# Patient Record
Sex: FEMALE | Race: WHITE | NOT HISPANIC OR LATINO | URBAN - METROPOLITAN AREA
[De-identification: names, ages, dates, MRNs, and addresses within clinical notes are randomized per-mention and may not be internally consistent; named-entity substitution may affect disease eponyms.]

---

## 2017-06-05 ENCOUNTER — APPOINTMENT (RX ONLY)
Dept: URBAN - METROPOLITAN AREA CLINIC 156 | Facility: CLINIC | Age: 62
Setting detail: DERMATOLOGY
End: 2017-06-05

## 2017-06-05 VITALS — WEIGHT: 196 LBS | DIASTOLIC BLOOD PRESSURE: 86 MMHG | HEART RATE: 91 BPM | SYSTOLIC BLOOD PRESSURE: 129 MMHG

## 2017-06-05 DIAGNOSIS — L259 CONTACT DERMATITIS AND OTHER ECZEMA, UNSPECIFIED CAUSE: ICD-10-CM

## 2017-06-05 PROBLEM — I10 ESSENTIAL (PRIMARY) HYPERTENSION: Status: ACTIVE | Noted: 2017-06-05

## 2017-06-05 PROBLEM — L23.9 ALLERGIC CONTACT DERMATITIS, UNSPECIFIED CAUSE: Status: ACTIVE | Noted: 2017-06-05

## 2017-06-05 PROCEDURE — ? PRESCRIPTION

## 2017-06-05 PROCEDURE — ? TREATMENT REGIMEN

## 2017-06-05 PROCEDURE — ? COUNSELING

## 2017-06-05 PROCEDURE — 99202 OFFICE O/P NEW SF 15 MIN: CPT

## 2017-06-05 RX ORDER — DESONIDE 0.5 MG/G
CREAM TOPICAL
Qty: 3 | Refills: 1 | Status: ERX

## 2017-06-05 RX ORDER — PREDNISONE 20 MG/1
TABLET ORAL
Qty: 30 | Refills: 0 | Status: ERX

## 2017-06-05 ASSESSMENT — LOCATION ZONE DERM: LOCATION ZONE: FACE

## 2017-06-05 ASSESSMENT — LOCATION SIMPLE DESCRIPTION DERM
LOCATION SIMPLE: LEFT CHEEK
LOCATION SIMPLE: INFERIOR FOREHEAD
LOCATION SIMPLE: RIGHT CHEEK

## 2017-06-05 ASSESSMENT — LOCATION DETAILED DESCRIPTION DERM
LOCATION DETAILED: INFERIOR MID FOREHEAD
LOCATION DETAILED: RIGHT SUPERIOR LATERAL MALAR CHEEK
LOCATION DETAILED: LEFT LATERAL BUCCAL CHEEK

## 2017-08-23 ENCOUNTER — APPOINTMENT (RX ONLY)
Dept: URBAN - METROPOLITAN AREA CLINIC 156 | Facility: CLINIC | Age: 62
Setting detail: DERMATOLOGY
End: 2017-08-23

## 2017-08-23 DIAGNOSIS — L82.1 OTHER SEBORRHEIC KERATOSIS: ICD-10-CM

## 2017-08-23 DIAGNOSIS — L259 CONTACT DERMATITIS AND OTHER ECZEMA, UNSPECIFIED CAUSE: ICD-10-CM | Status: INADEQUATELY CONTROLLED

## 2017-08-23 PROBLEM — L23.9 ALLERGIC CONTACT DERMATITIS, UNSPECIFIED CAUSE: Status: ACTIVE | Noted: 2017-08-23

## 2017-08-23 PROCEDURE — ? PRESCRIPTION

## 2017-08-23 PROCEDURE — ? COUNSELING

## 2017-08-23 PROCEDURE — 99213 OFFICE O/P EST LOW 20 MIN: CPT

## 2017-08-23 PROCEDURE — ? TREATMENT REGIMEN

## 2017-08-23 RX ORDER — KETOCONAZOLE 20 MG/G
CREAM TOPICAL
Qty: 1 | Refills: 0 | Status: ERX

## 2017-08-23 ASSESSMENT — LOCATION DETAILED DESCRIPTION DERM
LOCATION DETAILED: RIGHT SUPERIOR LATERAL MALAR CHEEK
LOCATION DETAILED: INFERIOR MID FOREHEAD
LOCATION DETAILED: LEFT LATERAL BUCCAL CHEEK
LOCATION DETAILED: LEFT SUPERIOR PREAURICULAR CHEEK

## 2017-08-23 ASSESSMENT — LOCATION SIMPLE DESCRIPTION DERM
LOCATION SIMPLE: INFERIOR FOREHEAD
LOCATION SIMPLE: RIGHT CHEEK
LOCATION SIMPLE: LEFT CHEEK

## 2017-08-23 ASSESSMENT — LOCATION ZONE DERM: LOCATION ZONE: FACE

## 2017-08-23 NOTE — PROCEDURE: TREATMENT REGIMEN
Discontinue Regimen: Stop all cosmetic products
Detail Level: Simple
Plan: Start Vanicream products daily. Apply Desonide and Ketoconazole cream daily x 7 days, after 7 days STOP the Desonide and continue the Ketoconazole
Otc Regimen: Vanicream bar soap, Vanicream Lite lotions, Neutrogena Pure & Free baby sunscreen

## 2017-09-27 ENCOUNTER — APPOINTMENT (RX ONLY)
Dept: URBAN - METROPOLITAN AREA CLINIC 156 | Facility: CLINIC | Age: 62
Setting detail: DERMATOLOGY
End: 2017-09-27

## 2017-09-27 VITALS
WEIGHT: 196 LBS | SYSTOLIC BLOOD PRESSURE: 141 MMHG | HEART RATE: 79 BPM | DIASTOLIC BLOOD PRESSURE: 84 MMHG | HEIGHT: 64 IN

## 2017-09-27 DIAGNOSIS — L259 CONTACT DERMATITIS AND OTHER ECZEMA, UNSPECIFIED CAUSE: ICD-10-CM

## 2017-09-27 PROBLEM — L30.9 DERMATITIS, UNSPECIFIED: Status: ACTIVE | Noted: 2017-09-27

## 2017-09-27 PROCEDURE — ? TREATMENT REGIMEN

## 2017-09-27 PROCEDURE — ? COUNSELING

## 2017-09-27 PROCEDURE — 99212 OFFICE O/P EST SF 10 MIN: CPT

## 2017-09-27 ASSESSMENT — LOCATION DETAILED DESCRIPTION DERM
LOCATION DETAILED: RIGHT INFERIOR CENTRAL MALAR CHEEK
LOCATION DETAILED: INFERIOR MID FOREHEAD
LOCATION DETAILED: LEFT INFERIOR CENTRAL MALAR CHEEK

## 2017-09-27 ASSESSMENT — LOCATION SIMPLE DESCRIPTION DERM
LOCATION SIMPLE: INFERIOR FOREHEAD
LOCATION SIMPLE: RIGHT CHEEK
LOCATION SIMPLE: LEFT CHEEK

## 2017-09-27 ASSESSMENT — LOCATION ZONE DERM: LOCATION ZONE: FACE

## 2017-09-27 NOTE — PROCEDURE: TREATMENT REGIMEN
Otc Regimen: Vanicream bar soap and lite lotion, Neutrogena pure and free baby sunscreen
Detail Level: Simple
Samples Given: Xolegel (Apply topically to affected areas daily) will call for prescription if controlled.
Discontinue Regimen: Desonide and ketoconazole
Plan: Instructed patient to only use desonide twice a week if flaring and will call if not controlled. If controlled with xolegel, instructed patient to slowly work in personal care products.

## 2018-01-02 ENCOUNTER — APPOINTMENT (RX ONLY)
Dept: URBAN - METROPOLITAN AREA CLINIC 157 | Facility: CLINIC | Age: 63
Setting detail: DERMATOLOGY
End: 2018-01-02

## 2018-01-02 DIAGNOSIS — L81.4 OTHER MELANIN HYPERPIGMENTATION: ICD-10-CM

## 2018-01-02 DIAGNOSIS — L23.9 ALLERGIC CONTACT DERMATITIS, UNSPECIFIED CAUSE: ICD-10-CM

## 2018-01-02 PROBLEM — L30.9 DERMATITIS, UNSPECIFIED: Status: ACTIVE | Noted: 2018-01-02

## 2018-01-02 PROCEDURE — ? TREATMENT REGIMEN

## 2018-01-02 PROCEDURE — ? INJECTION

## 2018-01-02 PROCEDURE — 96372 THER/PROPH/DIAG INJ SC/IM: CPT

## 2018-01-02 PROCEDURE — 99212 OFFICE O/P EST SF 10 MIN: CPT | Mod: 25

## 2018-01-02 PROCEDURE — ? COUNSELING

## 2018-01-02 PROCEDURE — ? INTRAMUSCULAR KENALOG

## 2018-01-02 ASSESSMENT — LOCATION DETAILED DESCRIPTION DERM
LOCATION DETAILED: RIGHT INFERIOR FOREHEAD
LOCATION DETAILED: LEFT BUTTOCK
LOCATION DETAILED: RIGHT CENTRAL MALAR CHEEK
LOCATION DETAILED: RIGHT SUPERIOR MEDIAL BUCCAL CHEEK
LOCATION DETAILED: LEFT INFERIOR MEDIAL FOREHEAD
LOCATION DETAILED: RIGHT BUTTOCK

## 2018-01-02 ASSESSMENT — LOCATION SIMPLE DESCRIPTION DERM
LOCATION SIMPLE: RIGHT FOREHEAD
LOCATION SIMPLE: RIGHT BUTTOCK
LOCATION SIMPLE: LEFT FOREHEAD
LOCATION SIMPLE: RIGHT CHEEK
LOCATION SIMPLE: LEFT BUTTOCK

## 2018-01-02 ASSESSMENT — LOCATION ZONE DERM
LOCATION ZONE: FACE
LOCATION ZONE: TRUNK

## 2018-01-02 NOTE — PROCEDURE: INJECTION
Route: IM
Procedure Information: Please note that the numeric value listed in the Medication (1) and associated J-code units and Medication (2) and associated J-code units variables are j-code amounts and do not represent either the concentration or the total amount of the medications injected.  I strongly recommend selecting no to the Render J-code information in note question. This will allow your note to be more clear. If you are billing j-codes with your injection codes you need to document the total amount of the medication injected. This amount should match the j-code units. For example, if you are injecting Triamcinolone 40mg as an intramuscular injection you would select 40 for the dose field and mg for the units. This would allow you to document  with 4 units (40mg = 10mg x 4). The total volume is not used to calculate j-codes only the amount of the medication administered.
Dose Administered (Numbers Only): 6
Consent: The risks of the medication was reviewed with the patient.
Treatment Number: 0
Detail Level: None
Render J-Code Information In Note?: yes
Units: mg
Medication (1) And Associated J-Code Units: Betamethasone Sodium Phosphate, 3mg
Post-Care Instructions: I reviewed with the patient in detail post-care instructions. Patient understands to keep the injection sites clean and call the clinic if there is any redness, swelling or pain.

## 2018-01-02 NOTE — PROCEDURE: TREATMENT REGIMEN
Detail Level: Zone
Plan: Patient advised to only apply the Vaseline to the affected areas on the face, \\nWill precert patient for patch testing she will return for patch test application in 6 weeks

## 2018-01-02 NOTE — PROCEDURE: INTRAMUSCULAR KENALOG
Consent: The risks of atrophy were reviewed with the patient.
Kenalog Preparation: kenalog
Total Volume (Ccs): 2
Detail Level: None
Concentration (Mg/Ml): 40.0

## 2019-04-05 ENCOUNTER — APPOINTMENT (RX ONLY)
Dept: URBAN - METROPOLITAN AREA CLINIC 157 | Facility: CLINIC | Age: 64
Setting detail: DERMATOLOGY
End: 2019-04-05

## 2019-04-05 DIAGNOSIS — L82.1 OTHER SEBORRHEIC KERATOSIS: ICD-10-CM

## 2019-04-05 DIAGNOSIS — L71.0 PERIORAL DERMATITIS: ICD-10-CM

## 2019-04-05 PROBLEM — L30.9 DERMATITIS, UNSPECIFIED: Status: ACTIVE | Noted: 2019-04-05

## 2019-04-05 PROCEDURE — 99213 OFFICE O/P EST LOW 20 MIN: CPT

## 2019-04-05 PROCEDURE — ? TREATMENT REGIMEN

## 2019-04-05 PROCEDURE — ? COUNSELING

## 2019-04-05 ASSESSMENT — LOCATION SIMPLE DESCRIPTION DERM
LOCATION SIMPLE: LEFT THIGH
LOCATION SIMPLE: LEFT CHEEK

## 2019-04-05 ASSESSMENT — LOCATION DETAILED DESCRIPTION DERM
LOCATION DETAILED: LEFT SUPERIOR CENTRAL MALAR CHEEK
LOCATION DETAILED: LEFT POSTERIOR LATERAL DISTAL THIGH

## 2019-04-05 ASSESSMENT — LOCATION ZONE DERM
LOCATION ZONE: FACE
LOCATION ZONE: LEG

## 2019-04-05 NOTE — PROCEDURE: TREATMENT REGIMEN
Detail Level: Zone
Plan: Instructed patient to to use over the counter Hydrocortisone 1% cream twice a day x2 weeks